# Patient Record
Sex: FEMALE | Race: OTHER | Employment: UNEMPLOYED | URBAN - METROPOLITAN AREA
[De-identification: names, ages, dates, MRNs, and addresses within clinical notes are randomized per-mention and may not be internally consistent; named-entity substitution may affect disease eponyms.]

---

## 2017-07-28 ENCOUNTER — HOSPITAL ENCOUNTER (EMERGENCY)
Age: 22
Discharge: HOME | End: 2017-07-29
Attending: EMERGENCY MEDICINE
Payer: SELF-PAY

## 2017-07-28 VITALS
DIASTOLIC BLOOD PRESSURE: 92 MMHG | SYSTOLIC BLOOD PRESSURE: 142 MMHG | HEART RATE: 85 BPM | RESPIRATION RATE: 16 BRPM | OXYGEN SATURATION: 100 % | HEIGHT: 65 IN | TEMPERATURE: 98 F | WEIGHT: 192 LBS | BODY MASS INDEX: 31.99 KG/M2

## 2017-07-28 DIAGNOSIS — Z32.01 PREGNANCY TEST PERFORMED, PREGNANCY CONFIRMED: Primary | ICD-10-CM

## 2017-07-28 LAB
BASOPHILS # BLD AUTO: 0 K/UL (ref 0–0.2)
BASOPHILS # BLD: 0 % (ref 0–2)
DIFFERENTIAL METHOD BLD: ABNORMAL
EOSINOPHIL # BLD: 0.2 K/UL (ref 0–0.8)
EOSINOPHIL NFR BLD: 1 % (ref 0.5–7.8)
ERYTHROCYTE [DISTWIDTH] IN BLOOD BY AUTOMATED COUNT: 15.1 % (ref 11.9–14.6)
HCT VFR BLD AUTO: 37.5 % (ref 35.8–46.3)
HGB BLD-MCNC: 12.7 G/DL (ref 11.7–15.4)
IMM GRANULOCYTES # BLD: 0 K/UL (ref 0–0.5)
IMM GRANULOCYTES NFR BLD AUTO: 0.3 % (ref 0–5)
LYMPHOCYTES # BLD AUTO: 24 % (ref 13–44)
LYMPHOCYTES # BLD: 3 K/UL (ref 0.5–4.6)
MCH RBC QN AUTO: 28.7 PG (ref 26.1–32.9)
MCHC RBC AUTO-ENTMCNC: 33.9 G/DL (ref 31.4–35)
MCV RBC AUTO: 84.7 FL (ref 79.6–97.8)
MONOCYTES # BLD: 1.1 K/UL (ref 0.1–1.3)
MONOCYTES NFR BLD AUTO: 9 % (ref 4–12)
NEUTS SEG # BLD: 8.2 K/UL (ref 1.7–8.2)
NEUTS SEG NFR BLD AUTO: 66 % (ref 43–78)
PLATELET # BLD AUTO: 203 K/UL (ref 150–450)
PMV BLD AUTO: 12.7 FL (ref 10.8–14.1)
RBC # BLD AUTO: 4.43 M/UL (ref 4.05–5.25)
WBC # BLD AUTO: 12.5 K/UL (ref 4.3–11.1)

## 2017-07-28 PROCEDURE — 99284 EMERGENCY DEPT VISIT MOD MDM: CPT | Performed by: EMERGENCY MEDICINE

## 2017-07-28 PROCEDURE — 85025 COMPLETE CBC W/AUTO DIFF WBC: CPT | Performed by: EMERGENCY MEDICINE

## 2017-07-28 PROCEDURE — 80053 COMPREHEN METABOLIC PANEL: CPT | Performed by: EMERGENCY MEDICINE

## 2017-07-29 LAB
ALBUMIN SERPL BCP-MCNC: 2.8 G/DL (ref 3.5–5)
ALBUMIN/GLOB SERPL: 0.7 {RATIO} (ref 1.2–3.5)
ALP SERPL-CCNC: 495 U/L (ref 50–136)
ALT SERPL-CCNC: 18 U/L (ref 12–65)
ANION GAP BLD CALC-SCNC: 11 MMOL/L (ref 7–16)
AST SERPL W P-5'-P-CCNC: 22 U/L (ref 15–37)
BILIRUB SERPL-MCNC: 0.3 MG/DL (ref 0.2–1.1)
BUN SERPL-MCNC: 9 MG/DL (ref 6–23)
CALCIUM SERPL-MCNC: 8.7 MG/DL (ref 8.3–10.4)
CHLORIDE SERPL-SCNC: 109 MMOL/L (ref 98–107)
CO2 SERPL-SCNC: 22 MMOL/L (ref 21–32)
CREAT SERPL-MCNC: 0.58 MG/DL (ref 0.6–1)
GLOBULIN SER CALC-MCNC: 4.3 G/DL (ref 2.3–3.5)
GLUCOSE SERPL-MCNC: 74 MG/DL (ref 65–100)
POTASSIUM SERPL-SCNC: 3.9 MMOL/L (ref 3.5–5.1)
PROT SERPL-MCNC: 7.1 G/DL (ref 6.3–8.2)
SODIUM SERPL-SCNC: 142 MMOL/L (ref 136–145)

## 2017-07-29 NOTE — ED NOTES
I have reviewed discharge instructions with the patient. The patient verbalized understanding.  at bedside for any questions or clarifications.

## 2017-07-29 NOTE — ED TRIAGE NOTES
Pt arrives complaining of a pregnancy problem. States she is 39 weeks and has been having contractions today. Pt states the contractions are about 5 minutes apart. Pt states she's had a previous child. Pt has had prenatal care at [facility]. Pt states the contractions started about 2-3 hours ago. Pt speaks no [place],  on the way.

## 2017-07-29 NOTE — ED PROVIDER NOTES
HPI Comments: Patient is  at approximately 39 weeks, OB is at [facility]. She started having some contractions this afternoon. She went to [facility] and was seen by OB. She was told that she was 3 cm dilated and was sent home. She went home and started having similar symptoms she came here. She states that she gets lower abdominal pain when she walks around    Elements of this note were made using speech recognition software. As such, errors of speech recognition may occur. Patient is a 24 y.o. female presenting with pregnancy problem. The history is provided by the patient. The history is limited by a language barrier. A  was used. Pregnancy Problem    Pertinent negatives include no fever, no nausea and no vomiting. History reviewed. No pertinent past medical history. History reviewed. No pertinent surgical history. History reviewed. No pertinent family history. Social History     Social History    Marital status: N/A     Spouse name: N/A    Number of children: N/A    Years of education: N/A     Occupational History    Not on file. Social History Main Topics    Smoking status: Not on file    Smokeless tobacco: Not on file    Alcohol use Not on file    Drug use: Not on file    Sexual activity: Not on file     Other Topics Concern    Not on file     Social History Narrative    No narrative on file         ALLERGIES: Review of patient's allergies indicates no known allergies. Review of Systems   Constitutional: Negative for chills and fever. Gastrointestinal: Negative for nausea and vomiting. All other systems reviewed and are negative. Vitals:    17 2322   BP: (!) 142/92   Pulse: 85   Resp: 16   Temp: 98 °F (36.7 °C)   SpO2: 100%   Weight: 87.1 kg (192 lb)   Height: 5' 5\" (1.651 m)            Physical Exam   Constitutional: She appears well-developed and well-nourished. HENT:   Head: Normocephalic and atraumatic.    Eyes: Conjunctivae are normal. Pupils are equal, round, and reactive to light. Neck: Normal range of motion. Neck supple. Cardiovascular: Normal rate and regular rhythm. Pulmonary/Chest: Effort normal and breath sounds normal.   Abdominal: Soft. Bowel sounds are normal. She exhibits distension. Gravid uterus   Genitourinary:   Genitourinary Comments: Cervix is 3 cm   Musculoskeletal: She exhibits no edema or tenderness. Neurological: She is alert. Skin: Skin is warm and dry. Nursing note and vitals reviewed. MDM  Number of Diagnoses or Management Options  Diagnosis management comments: Differential diagnosis: Labor, [place] contractions, normal pregnancy  12:03 AM surgical records shows that she was just seen at [facility] earlier this evening. I spoke with Dr. Yu Fairchild, she is familiar with patient. She states that the patient is being scheduled for induction tomorrow. Hussein Jacobo going to call her with a time. She states she was 3 cm dilated and had no ferning on their exam.  The patient's blood pressure is currently normal and her fetal heart tones in the 140s.   She is currently stable for discharge       Amount and/or Complexity of Data Reviewed  Clinical lab tests: ordered and reviewed  Decide to obtain previous medical records or to obtain history from someone other than the patient: yes  Review and summarize past medical records: yes  Discuss the patient with other providers: yes    Risk of Complications, Morbidity, and/or Mortality  Presenting problems: moderate  Diagnostic procedures: moderate  Management options: moderate    Patient Progress  Patient progress: stable    ED Course       Procedures

## 2017-07-29 NOTE — DISCHARGE INSTRUCTIONS
Aprenda sobre la planificación de un futuro embarazo - [ Learning About Planning for Future Pregnancy ]  ¿Cómo puede prepararse para el Brennen Bolton? Incluso antes de [facility], anneliese puede ayudar a que montero embarazo sea lo más saludable posible. [place] las siguientes medidas:  · Ricki Chyle a un médico o a darrion enfermera partera certificada para hacerse un examen. Hable acerca de los medicamentos, vitaminas y hierbas que herve. Hable acerca de cualquier problema de jolie o inquietud que tenga. · No tome medicamentos antiinflamatorios no esteroideos (JEAN). Ejemplos de estos son el ibuprofeno y la aspirina. Pueden aumentar el riesgo de aborto espontáneo. El riesgo es mayor cerca del momento de la ceci o si los herve avril más de [place]. · [place] diariamente algún multivitamínico o darrion vitamina prenatal. Asegúrese de que contenga ácido fólico. [place] reducirá la probabilidad de tener un bebé con darrion anomalía congénita (de nacimiento). · Lleve un registro de montero ciclo menstrual. [place] es darrion buena idea por [place] a saber cuál es el mejor momento para tratar de [place]. Y puede ayudar a montero médico o partera a calcular la fecha del parto y cómo está creciendo montero bebé. · [place] decisiones saludables y coma johnny. Evite la cafeína o redúzcala a solo 1 taza de café o té al día. Evite el alcohol, los cigarrillos y las drogas [address] solo los [employer] que montero médico o la partera le autoricen. · Perez bastante ejercicio. Un cuerpo angelica hará que el embarazo y el parto donna más fáciles. También la ayudará a recuperarse después del parto. Y el ejercicio puede ayudar a mejorar montero estado de ánimo. ¿Qué otros exámenes o pruebas debe hacerse? Antes de tratar de quedar embarazada, [place] cualquier otro problema de jolie que pueda Agia Thekla. · Si tiene diabetes o presión arterial bran, o si está obesa, hable con montero médico antes de quedar embarazada.  Juntos podrán hacer un plan sobre cómo controlar estos problemas de [place]. · Hable con montero médico acerca de cualquier medicamento que usted tome. [place] si es seguro seguir tomándolo avril el FIZZA. · Póngase cualquier vacuna que necesite. Pueden ayudar a prevenir anomalías congénitas, un aborto espontáneo o que nazca muerto el bebé debido a infecciones ant la rubéola o el sarampión. Pregúntele a montero médico cuánto tiempo debe esperar después de haberse aplicado darrion vacuna antes de intentar Sean Mocha. · Hable con montero médico acerca de si debe hacerse pruebas de detección de enfermedades hereditarias (trastornos genéticos). Estas enfermedades incluyen:  ¨ La fibrosis quística. ¨ La enfermedad de células falciformes. ¨ La enfermedad de [place]. Si piensa que pudiera estar embarazada  · Puede utilizar darrion prueba de embarazo en montero hogar a partir del primer día en que no le llegó montero período menstrual.  · Tan pronto ant sepa que está [place], zoraida darrion damian con montero médico o enfermera partera certificada. Montero primera visita prenatal proporcionará información que puede ser [place] para verificar si hay problemas a medida que avanza montero embarazo. ¿Dónde puede encontrar más información en inglés? Mickey Litter a [email or web address]-truong.info/. Francisco Plummerow L272 en la búsqueda para aprender más acerca de \"Aprenda sobre la planificación de un futuro embarazo - [ Learning About Planning for Future Pregnancy ]. \"  Revisado: 16 marzo, 2017  Versión del contenido: 11.3  © 6184-0583 [employer], [employer]. Las instrucciones de cuidado fueron adaptadas bajo licencia por Good Help Connections (which disclaims liability or warranty for this information). Si usted tiene [place] afección médica o sobre estas instrucciones, siempre pregunte a montero profesional de jolie. [facility], Incorporated niega toda garantía o responsabilidad por montero uso de esta información.

## 2019-03-11 ENCOUNTER — HOSPITAL ENCOUNTER (EMERGENCY)
Age: 24
Discharge: HOME | End: 2019-03-11
Attending: EMERGENCY MEDICINE
Payer: MEDICAID

## 2019-03-11 ENCOUNTER — APPOINTMENT (OUTPATIENT)
Dept: CT IMAGING | Age: 24
End: 2019-03-11
Attending: EMERGENCY MEDICINE
Payer: MEDICAID

## 2019-03-11 VITALS
WEIGHT: 170 LBS | HEART RATE: 68 BPM | DIASTOLIC BLOOD PRESSURE: 80 MMHG | TEMPERATURE: 98.4 F | SYSTOLIC BLOOD PRESSURE: 136 MMHG | OXYGEN SATURATION: 98 % | RESPIRATION RATE: 16 BRPM

## 2019-03-11 DIAGNOSIS — G51.0 LEFT-SIDED BELL'S PALSY: ICD-10-CM

## 2019-03-11 DIAGNOSIS — H81.392 PERIPHERAL VERTIGO INVOLVING LEFT EAR: ICD-10-CM

## 2019-03-11 DIAGNOSIS — R51.9 LEFT TEMPORAL HEADACHE: Primary | ICD-10-CM

## 2019-03-11 LAB
ANION GAP SERPL CALC-SCNC: 5 MMOL/L (ref 7–16)
BACTERIA URNS QL MICRO: ABNORMAL /HPF
BASOPHILS # BLD: 0 K/UL (ref 0–0.2)
BASOPHILS NFR BLD: 0 % (ref 0–2)
BUN SERPL-MCNC: 9 MG/DL (ref 6–23)
CALCIUM SERPL-MCNC: 8.8 MG/DL (ref 8.3–10.4)
CASTS URNS QL MICRO: ABNORMAL /LPF
CHLORIDE SERPL-SCNC: 109 MMOL/L (ref 98–107)
CO2 SERPL-SCNC: 28 MMOL/L (ref 21–32)
CREAT SERPL-MCNC: 0.74 MG/DL (ref 0.6–1)
DIFFERENTIAL METHOD BLD: ABNORMAL
EOSINOPHIL # BLD: 0.4 K/UL (ref 0–0.8)
EOSINOPHIL NFR BLD: 3 % (ref 0.5–7.8)
EPI CELLS #/AREA URNS HPF: ABNORMAL /HPF
ERYTHROCYTE [DISTWIDTH] IN BLOOD BY AUTOMATED COUNT: 16.4 % (ref 11.9–14.6)
GLUCOSE SERPL-MCNC: 91 MG/DL (ref 65–100)
HCG UR QL: NEGATIVE
HCT VFR BLD AUTO: 40 % (ref 35.8–46.3)
HGB BLD-MCNC: 12.8 G/DL (ref 11.7–15.4)
IMM GRANULOCYTES # BLD AUTO: 0.1 K/UL (ref 0–0.5)
IMM GRANULOCYTES NFR BLD AUTO: 0 % (ref 0–5)
LYMPHOCYTES # BLD: 4.6 K/UL (ref 0.5–4.6)
LYMPHOCYTES NFR BLD: 29 % (ref 13–44)
MCH RBC QN AUTO: 27.8 PG (ref 26.1–32.9)
MCHC RBC AUTO-ENTMCNC: 32 G/DL (ref 31.4–35)
MCV RBC AUTO: 86.8 FL (ref 79.6–97.8)
MONOCYTES # BLD: 0.9 K/UL (ref 0.1–1.3)
MONOCYTES NFR BLD: 6 % (ref 4–12)
NEUTS SEG # BLD: 9.6 K/UL (ref 1.7–8.2)
NEUTS SEG NFR BLD: 62 % (ref 43–78)
NRBC # BLD: 0 K/UL (ref 0–0.2)
PLATELET # BLD AUTO: 348 K/UL (ref 150–450)
PMV BLD AUTO: 11.7 FL (ref 9.4–12.3)
POTASSIUM SERPL-SCNC: 3.6 MMOL/L (ref 3.5–5.1)
RBC # BLD AUTO: 4.61 M/UL (ref 4.05–5.2)
RBC #/AREA URNS HPF: ABNORMAL /HPF
SODIUM SERPL-SCNC: 142 MMOL/L (ref 136–145)
WBC # BLD AUTO: 15.7 K/UL (ref 4.3–11.1)
WBC URNS QL MICRO: ABNORMAL /HPF

## 2019-03-11 PROCEDURE — 80048 BASIC METABOLIC PNL TOTAL CA: CPT

## 2019-03-11 PROCEDURE — 85025 COMPLETE CBC W/AUTO DIFF WBC: CPT

## 2019-03-11 PROCEDURE — 96374 THER/PROPH/DIAG INJ IV PUSH: CPT | Performed by: EMERGENCY MEDICINE

## 2019-03-11 PROCEDURE — 70450 CT HEAD/BRAIN W/O DYE: CPT

## 2019-03-11 PROCEDURE — 96375 TX/PRO/DX INJ NEW DRUG ADDON: CPT | Performed by: EMERGENCY MEDICINE

## 2019-03-11 PROCEDURE — 99284 EMERGENCY DEPT VISIT MOD MDM: CPT | Performed by: EMERGENCY MEDICINE

## 2019-03-11 PROCEDURE — 81015 MICROSCOPIC EXAM OF URINE: CPT

## 2019-03-11 PROCEDURE — 81025 URINE PREGNANCY TEST: CPT

## 2019-03-11 PROCEDURE — 74011250636 HC RX REV CODE- 250/636: Performed by: EMERGENCY MEDICINE

## 2019-03-11 RX ORDER — KETOROLAC TROMETHAMINE 30 MG/ML
30 INJECTION, SOLUTION INTRAMUSCULAR; INTRAVENOUS
Status: COMPLETED | OUTPATIENT
Start: 2019-03-11 | End: 2019-03-11

## 2019-03-11 RX ORDER — BUTALBITAL, ACETAMINOPHEN AND CAFFEINE 300; 40; 50 MG/1; MG/1; MG/1
1 CAPSULE ORAL
Qty: 12 CAP | Refills: 0 | Status: SHIPPED | OUTPATIENT
Start: 2019-03-11

## 2019-03-11 RX ORDER — PREDNISONE 20 MG/1
40 TABLET ORAL DAILY
Qty: 10 TAB | Refills: 0 | Status: SHIPPED | OUTPATIENT
Start: 2019-03-11 | End: 2019-03-16

## 2019-03-11 RX ORDER — PROMETHAZINE HYDROCHLORIDE 25 MG/1
25 TABLET ORAL
Qty: 12 TAB | Refills: 0 | Status: SHIPPED | OUTPATIENT
Start: 2019-03-11

## 2019-03-11 RX ORDER — ONDANSETRON 2 MG/ML
4 INJECTION INTRAMUSCULAR; INTRAVENOUS
Status: COMPLETED | OUTPATIENT
Start: 2019-03-11 | End: 2019-03-11

## 2019-03-11 RX ADMIN — KETOROLAC TROMETHAMINE 30 MG: 30 INJECTION, SOLUTION INTRAMUSCULAR; INTRAVENOUS at 20:43

## 2019-03-11 RX ADMIN — SODIUM CHLORIDE 1000 ML: 900 INJECTION, SOLUTION INTRAVENOUS at 20:43

## 2019-03-11 RX ADMIN — ONDANSETRON 4 MG: 2 INJECTION INTRAMUSCULAR; INTRAVENOUS at 20:43

## 2019-03-11 NOTE — ED TRIAGE NOTES
Pt states bells palsy in left side of face that started 8 days ago. Pt states she was given acyclovir. Pt states she stopped taking these 3 days ago and now is feeling dizzy with some nausea as well.  States photophobia as well

## 2019-03-11 NOTE — PROGRESS NOTES
present for triage. Thank you,      Domingo Jalloh, [address] [facility] [address] /  [email or web address] c: [phone] / [address] / [place], [address] [facility]  [email or web address]

## 2019-03-12 NOTE — PROGRESS NOTES
present for assessment with Dr. Irma Manuel. Thank you,      Bolden Basiltaocorbin, [address] [facility] 151 /  [email or web address] c: [phone] / [address] / [place], [address] [facility]  [email or web address]

## 2019-03-12 NOTE — DISCHARGE INSTRUCTIONS
Phenergan is for nausea and dizziness. Fioricet for headache. Prednisone for swelling and to help with Bell's palsy. Important to call for primary care doctor to recheck. Recheck sooner for worse or worrisome symptoms. Patient Education        Parálisis facial de Mclean: Instrucciones de cuidado - [ Bell's Palsy: Care Instructions ]  Instrucciones de cuidado    La parálisis facial de [place]clean es darrion parálisis o debilitamiento de los músculos de un lado de la krystal. Las personas con parálisis facial de [place] suelen tener caído un lado de la boca y les anyi trabajo cerrar por completo el beryl de lottie mismo lado. La parálisis facial de [place] puede interferir también con el sentido del gusto. La [place] sucede cuando se inflama un nervio de la krystal. La causa de la parálisis facial de [place] no es un ataque cerebral. No se conoce la causa de esta inflamación del nervio. Jo algunos expertos piensan que la causa podría ser un virus. Debido a esto, en ocasiones los médicos recetan un medicamento antiviral para tratarla. También podrían darle medicamentos para reducir la hinchazón. La parálisis facial de [place]clean por lo general mejora por sí ginny en algunas semanas o meses. La atención de seguimiento es darrion parte clave de montero tratamiento y seguridad. Asegúrese de hacer y acudir a todas las citas, y llame a montero médico si está teniendo problemas. También es darrion buena idea saber los resultados de sona exámenes y mantener darrion lista de los medicamentos que herve. ¿Cómo puede cuidarse en el hogar? · [employer] medicamentos exactamente ant le fueron recetados. Llame a montero médico si jericho estar teniendo problemas con montero medicamento. Recibirá [employer] medicamentos específicos recetados por montero médico.  · Use lágrimas artificiales o pomada si se le secan demasiado los ojos. La parálisis facial de [place]clean puede causar la caída del párpado inferior, lo que produce sequedad en el beryl.   · Si no puede cerrar el beryl por completo, piense en usar un parche para dormir. · Ayúdese a parpadear usando un dedo para cerrar y abrir el párpado. [place] podría ayudar a mantener el beryl húmedo. · Use anteojos o gafas para prevenir que el polvo y la ally entren en el beryl. · A medida que recupera la sensación en la krystal, masajee la frente, las mejillas y los labios. El masaje podría fortalecer los músculos de la krystal. · Cepíllese los dientes y use hilo dental con frecuencia para ayudar a prevenir las caries. La parálisis facial de [place]clean puede secar la saliva en un lado de montero boca. [place] aumenta el riesgo de caries. ¿Cuándo debe pedir ayuda? Llame al 911 en cualquier momento que considere que necesita atención de [place]. Por ejemplo, llame si:    · Tiene síntomas de un ataque cerebral. Estos pueden incluir:  ? Entumecimiento, hormigueo, debilidad o parálisis repentinos en la krystal, el brazo o la pierna, sobre todo si ocurre en un solo lado del cuerpo. ? Cambios súbitos en la vista. ? Problemas repentinos para hablar. ? Confusión súbita o dificultad repentina para comprender frases sencillas. ? Problemas repentinos para caminar o mantener el equilibrio. ? Un dolor de ebonie intenso y repentino, distinto a los beverly de ebonie anteriores.    Llame a montero médico ahora mismo o busque atención médica inmediata si:    · Siente entumecimiento o debilidad que se expande más allá de un lado de la krystal.     · Tiene un salpullido en la piel o dolor o enrojecimiento en el beryl, o le molesta la victoria.     · Tiene nuevo dolor de ebonie o cyrus empeora.    Preste especial atención a los cambios en montero jolie y asegúrese de comunicarse con montero médico si:    · No mejora ant se esperaba. ¿Dónde puede encontrar más información en inglés? Carmen Presto a [email or web address]-truong.[email or web address] Solange Ferrer [id number] en la búsqueda para aprender más acerca de \"Parálisis facial de Mclean: Instrucciones de cuidado - [ Bell's Palsy: Care Instructions ]. \"  Revisado: 3 shu, 2018  Versión del contenido: 11.9  © 7556-2102 Healthwise, Incorporated. Las instrucciones de cuidado fueron adaptadas bajo licencia por Good Help Connections (which disclaims liability or warranty for this information). Si usted tiene [place] afección médica o sobre estas instrucciones, siempre pregunte a montero profesional de jolie. Healthwise, Incorporated niega toda garantía o responsabilidad por montero uso de esta información. Patient Education        Vértigo: Sylwia Salazar - [ Vertigo: Care Instructions ]  Instrucciones de cuidado    El vértigo es darrion sensación de que usted o el ambiente que le rodea se mueve cuando no es así. Con frecuencia se describe ant darrion sensación de girar, cody vueltas, caer o inclinarse. El vértigo podría hacer que vomite o sienta náuseas. Podría tener dificultades para caminar o estar de pie y podría perder el equilibrio. El vértigo es a [employer] relacionado con un problema del oído interno, minor puede tener otras causas más serias. Si el vértigo continúa, usted podría necesitar más pruebas para hallar montero causa. La atención de seguimiento es darrion parte clave de montero tratamiento y seguridad. Asegúrese de hacer y acudir a todas las citas, y llame a montero médico si está teniendo problemas. También es darrion buena idea saber los resultados de sona exámenes y mantener darrion lista de los medicamentos que herve. ¿Cómo puede cuidarse en el hogar? · No se acueste boca arriba. Elévese un poco. [place] podría reducir la sensación de girar. Mantenga los ojos abiertos. · Muévase despacio para no caer. · Si montero médico le recomienda algún medicamento, tómelo exactamente según las indicaciones. · No conduzca cuando tenga vértigo. Ciertos ejercicios, conocidos ant ejercicios de Master, pueden ayudar a disminuir el vértigo. Para hacer los ejercicios de Master:  · Siéntese al borde de darrion cama o un sofá y acuéstese rápido del lado que le causa el peor vértigo.  Acuéstese de lado, sobre Climmie Gentry afectado. · Quédese en matilda posición avril por lo menos 30 segundos o hasta que el vértigo pase. · Siéntese. Si esto le causa vértigo, espere a que pase. · Repita cyrus procedimiento del otro lado. · Repita esto 10 veces. Perez estos ejercicios 2 veces al día hasta que la sensación de vértigo desaparezca. ¿Cuándo debe pedir ayuda? Llame al 911 en cualquier momento que considere que necesita atención de emergencia. Por ejemplo, llame si:    · Se desmayó (perdió el conocimiento).     · Tiene síntomas de un ataque cerebral. Estos podrían incluir:  ? Entumecimiento, hormigueo, debilitamiento o pérdida de movimiento repentinos en la krystal, el brazo o la pierna, sobre todo si ocurre en un solo lado del cuerpo. ? Cambios repentinos en la visión. ? Dificultades repentinas para hablar. ? Confusión repentina o dificultad para comprender frases sencillas. ? Problemas repentinos para caminar o mantener el equilibrio. ? Dolor de [place] intenso y repentino, distinto de los beverly de Marvin Olden a montero médico ahora mismo o busque atención médica inmediata si:    · Tiene vértigo junto con fiebre, dolor de ebonie o zumbido en los oídos.     · Tiene náuseas o vómito nuevos o éstos aumentan.    Preste especial atención a los cambios en montero jolie y asegúrese de comunicarse con montero médico si:    · El vértigo empeora u ocurre con mayor frecuencia.     · El vértigo no mejora después de 2 semanas. ¿Dónde puede encontrar más información en inglés? Ismael Safer a http://june-truong.[email or web address] Alex [id number] en la búsqueda para aprender más acerca de \"Vértigo: Instrucciones de cuidado - [ Vertigo: Care Instructions ]. \"  Revisado: Lilliam 67, 2018  Versión del contenido: 11.9  © 9654-8463 [employer], [employer]. Las instrucciones de cuidado fueron adaptadas bajo licencia por Good Help Connections (which disclaims liability or warranty for this information).  Si usted tiene preguntas sobre darrion afección Xochitl o sobre estas instrucciones, siempre pregunte a montero profesional de jolie. [employer], Incorporated niega toda garantía o responsabilidad por montero uso de esta información. Patient Education        Alfredoor parviz [place]: Instrucciones de cuidado - [ Headache: Care Instructions ]  Instrucciones de cuidado    Los beverly de ebonie tienen muchas causas posibles. La mayoría de los beverly de ebonie no son señal de un problema más layo y mejoran por sí solos. El tratamiento en el hogar podría ayudarlo a sentirse mejor con Author Bryan. El médico lo millard revisado minuciosamente, minor puede desarrollar problemas más tarde. Si nota algún problema o síntomas, busque tratamiento médico inmediatamente. La atención de seguimiento es darrion parte clave de montero tratamiento y seguridad. Asegúrese de hacer y acudir a todas las citas, y llame a montero médico si está teniendo problemas. También es darrion buena idea saber los resultados de sona exámenes y mantener darrion lista de los medicamentos que herve. ¿Cómo puede cuidarse en el [facility]? · No conduzca si ha tomado analgésicos (medicamentos para el dolor) recetados. · Descanse en un cuarto tranquilo y oscuro hasta que desaparezca el dolor de Michael [place]. Cierre los ojos y trate de relajarse o dormirse. No charles la televisión ni iam. · Colóquese un paño frío y húmedo o Rustam [employer] compresa fría sobre la kanwal adolorida de 10 a 21 minutos cada vez. Póngase un paño goodman entre la compresa fría y la piel. · Utilice darrion toalla húmeda tibia o darrion almohadilla térmica ajustada a baja temperatura para relajar los músculos tensos del marc y los hombros.  · [place] a alguien que le zoraida masajes suaves en el marc y los hombros.  · [place] los analgésicos exactamente ant le fueron indicados. ? Si el médico le recetó un analgésico, tómelo según las indicaciones. ? Si no está tomando un analgésico recetado, pregúntele a montero médico si puede celestino mukund de [employer].   · Tenga cuidado de no celestino analgésicos con mayor frecuencia que la permitida en las indicaciones porque los beverly de ebonie podrían empeorar o aparecer con mayor frecuencia darrion vez que el medicamento pierda montero [place]. · Preste atención a los nuevos síntomas que aparecen con el dolor de [place], [place], debilidad o entumecimiento, cambios en la visión o confusión. Podrían ser señales de un problema más grave. Para prevenir los beverly de ebonie  · [employer] un diario de sona beverly de ebonie para que pueda averiguar qué los desencadena. Evitar los desencadenantes podría ayudar a prevenir los beverly de [place]. Anote cuándo empieza cada dolor de [place], cuánto dura y cómo es el dolor (palpitante, mateo, punzante o sordo). Anote cualquier otro síntoma que haya tenido con el dolor de [place], [place] náuseas, destellos de victoria o BARAK, o sensibilidad a la victoria brillante o a los ruidos anna. [place] si el dolor de ebonie ocurrió cerca de montero menstruación. Enumere todos los factores que pudieran mark desencadenado el dolor de [place], ant ciertos alimentos (chocolate, queso, vino) u olores, humo, luces brillantes, estrés o falta de sueño. · Encuentre maneras saludables de [facility]. Los beverly de [place] son más comunes avril o rosalia después de un momento estresante. Tómese un tiempo para relajarse antes y después de hacer algo que le haya causado un dolor de ebonie en el pasado. · Trate de mantener sona músculos relajados mediante darrion buena postura. Revise si tiene [place] [employer] de la Cristy, la krystal, el marc y los hombros y trate de relajarlos. Cuando se siente en un escritorio, cambie de posición con frecuencia y estírese por 27 segundos cada hora. · Perez suficiente ejercicio y duerma bastante. · Coma en forma regular y johnny. Largos períodos sin comer pueden provocar un dolor de ebonie. · Regálese un masaje. Algunas personas encuentran que los masajes hechos con regularidad son [place] para aliviar la tensión. · Limite la cafeína. No joey demasiado café, té ni sodas. Jo no deje de consumir cafeína de repente, porque eso también puede provocarle beverly de [place]. · Reduzca la tensión en los ojos a causa de la computadora parpadeando con frecuencia y apartando la mirada de la pantalla a menudo. Asegúrese de tener lentes adecuados y de que montero monitor esté colocado de manera correcta, ant a un brazo de distancia. · Busque ayuda si tiene depresión o ansiedad. Tonie beverly de [place] podrían relacionarse con estas afecciones. El tratamiento puede prevenir los beverly de [place] y ayudar con los síntomas de ansiedad o depresión. ¿Cuándo debe pedir ayuda? Llame al 911 en cualquier momento que piense que puede necesitar atención de emergencia. Por ejemplo, llame si:    · Tiene señales de un ataque cerebral. Estas pueden incluir:  ? Parálisis, entumecimiento o debilidad repentinos en la krystal, el brazo o la pierna, sobre todo si ocurre en un solo lado del cuerpo. ? Cambios repentinos en la visión. ? Dificultades repentinas para hablar. ? Confusión repentina o dificultad para comprender frases sencillas. ? Problemas repentinos para caminar o mantener el equilibrio. ? Dolor de [place] intenso y repentino, distinto de los beverly de ebonie anteriores.    Llame a montero médico ahora mismo o busque atención médica inmediata si:    · Tiene un dolor de Elsa Severs o peor.     · Montero dolor de ebonie empeora mucho. ¿Dónde puede encontrar más información en inglés? Bev Thomas a [email or web address]-truong.info/. Heather [id number] en la búsqueda para aprender más acerca de \"Dolor de ebonie: Instrucciones de cuidado - [ Headache: Care Instructions ]. \"  Revisado: 3 shu, 2018  Versión del contenido: 11.9  © 2029-4312 [employer], [employer]. Las instrucciones de cuidado fueron adaptadas bajo licencia por Good Help Connections (which disclaims liability or warranty for this information).  Si usted tiene [place] afección médica o sobre estas instrucciones, siempre pregunte a montero profesional de jolie. [facility], Incorporated niega toda garantía o responsabilidad por montero uso de esta información.

## 2019-03-12 NOTE — ED PROVIDER NOTES
78-year-old female through  complains of 4 day history of left-sided headache. Worse when she bends her neck and worse when she chews. She does have some trouble with nausea and vertigo. Diagnosed 8 days ago with left-sided Bell's palsy and placed on acyclovir. She is finished that. States the headache started 4 days ago and has progressed. She denies any abdominal pain or fever or diarrhea. The neck chest pain or shortness of breath. No fever. No focal numbness or weakness. No difficulty talking swallowing. The history is provided by the patient. A  was used. Nausea    This is a new problem. The current episode started more than 2 days ago. The problem has not changed since onset. There has been no fever. Associated symptoms include headaches, arthralgias, myalgias and headaches. Pertinent negatives include no chills, no fever, no abdominal pain, no diarrhea and no cough. The patient maybe pregnant. History reviewed. No pertinent past medical history. History reviewed. No pertinent surgical history. History reviewed. No pertinent family history. Social History     Socioeconomic History    Marital status:      Spouse name: Not on file    Number of children: Not on file    Years of education: Not on file    Highest education level: Not on file   Social Needs    Financial resource strain: Not on file    Food insecurity - worry: Not on file    Food insecurity - inability: Not on file    Transportation needs - medical: Not on file   [employer] needs - non-medical: Not on file   Occupational History    Not on file   Tobacco Use    Smoking status: Not on file   Substance and Sexual Activity    Alcohol use: Not on file    Drug use: Not on file    Sexual activity: Not on file   Other Topics Concern    Not on file   Social History Narrative    Not on file         ALLERGIES: Patient has no allergy information on record.     Review of Systems   Constitutional: Negative for chills and fever. HENT: Positive for ear pain, hearing loss and tinnitus. Negative for sinus pressure and sore throat. Eyes: Negative for pain and visual disturbance. Respiratory: Negative for cough and shortness of breath. Cardiovascular: Negative for chest pain. Gastrointestinal: Positive for nausea. Negative for abdominal pain, diarrhea and vomiting. Musculoskeletal: Positive for arthralgias and myalgias. Negative for back pain, gait problem, neck pain and neck stiffness. Skin: Negative for color change and rash. Neurological: Positive for headaches. Negative for dizziness, syncope, weakness and numbness. All other systems reviewed and are negative. Vitals:    03/11/19 1832   BP: 131/76   Pulse: 73   Resp: 18   Temp: 98.2 °F (36.8 °C)   SpO2: 97%   Weight: 77.1 kg (170 lb)            Physical Exam   Constitutional: She is oriented to person, place, and time. She appears well-developed and well-nourished. No distress. HENT:   Head: Normocephalic and atraumatic. Right Ear: External ear normal.   Left Ear: External ear normal.   Mouth/Throat: Oropharynx is clear and moist. No oropharyngeal exudate. TMs appear clear. Some tenderness all around the L TMJ   Eyes: Conjunctivae and EOM are normal. Pupils are equal, round, and reactive to light. Neck: Normal range of motion. Neck supple. Cardiovascular: Normal rate, regular rhythm and intact distal pulses. No murmur heard. Pulmonary/Chest: Breath sounds normal. No respiratory distress. Abdominal: Soft. Bowel sounds are normal. She exhibits no mass. There is no tenderness. There is no rebound and no guarding. No hernia. Neurological: She is alert and oriented to person, place, and time. Gait normal.   Nl speech   Skin: Skin is warm and dry. Psychiatric: She has a normal mood and affect. Her speech is normal.   Nursing note and vitals reviewed.        MDM  Number of Diagnoses or Management Options  Diagnosis management comments: Headache, strongly suggestive of meningitis or subarachnoid hemorrhage or gradual onset and lateralizing signs. Check a head CT to assess for any tumor or sinusitis. Check screening lab work.        Amount and/or Complexity of Data Reviewed  Clinical lab tests: ordered and reviewed  Tests in the radiology section of CPT®: ordered and reviewed    Risk of Complications, Morbidity, and/or Mortality  Presenting problems: moderate  Diagnostic procedures: low  Management options: moderate    Patient Progress  Patient progress: stable         Procedures    Results Include:    Recent Results (from the past 24 hour(s))   HCG URINE, QL. - POC    Collection Time: 03/11/19  8:46 PM   Result Value Ref Range    Pregnancy test,urine (POC) NEGATIVE  NEG     URINE MICROSCOPIC    Collection Time: 03/11/19  8:49 PM   Result Value Ref Range    WBC 10-20 0 /hpf    RBC 0-3 0 /hpf    Epithelial cells 0-3 0 /hpf    Bacteria 4+ (H) 0 /hpf    Casts 0-3 0 /lpf   METABOLIC PANEL, BASIC    Collection Time: 03/11/19  9:00 PM   Result Value Ref Range    Sodium 142 136 - 145 mmol/L    Potassium 3.6 3.5 - 5.1 mmol/L    Chloride 109 (H) 98 - 107 mmol/L    CO2 28 21 - 32 mmol/L    Anion gap 5 (L) 7 - 16 mmol/L    Glucose 91 65 - 100 mg/dL    BUN 9 6 - 23 MG/DL    Creatinine 0.74 0.6 - 1.0 MG/DL    GFR est AA >60 >60 ml/min/1.73m2    GFR est non-AA >60 >60 ml/min/1.73m2    Calcium 8.8 8.3 - 10.4 MG/DL   CBC WITH AUTOMATED DIFF    Collection Time: 03/11/19  9:00 PM   Result Value Ref Range    WBC 15.7 (H) 4.3 - 11.1 K/uL    RBC 4.61 4.05 - 5.2 M/uL    HGB 12.8 11.7 - 15.4 g/dL    HCT 40.0 35.8 - 46.3 %    MCV 86.8 79.6 - 97.8 FL    MCH 27.8 26.1 - 32.9 PG    MCHC 32.0 31.4 - 35.0 g/dL    RDW 16.4 (H) 11.9 - 14.6 %    PLATELET 175 245 - 654 K/uL    MPV 11.7 9.4 - 12.3 FL    ABSOLUTE NRBC 0.00 0.0 - 0.2 K/uL    DF AUTOMATED      NEUTROPHILS 62 43 - 78 %    LYMPHOCYTES 29 13 - 44 %    MONOCYTES 6 4.0 - 12.0 %    EOSINOPHILS 3 0.5 - 7.8 %    BASOPHILS 0 0.0 - 2.0 %    IMMATURE GRANULOCYTES 0 0.0 - 5.0 %    ABS. NEUTROPHILS 9.6 (H) 1.7 - 8.2 K/UL    ABS. LYMPHOCYTES 4.6 0.5 - 4.6 K/UL    ABS. MONOCYTES 0.9 0.1 - 1.3 K/UL    ABS. EOSINOPHILS 0.4 0.0 - 0.8 K/UL    ABS. BASOPHILS 0.0 0.0 - 0.2 K/UL    ABS. IMM. GRANS. 0.1 0.0 - 0.5 K/UL     Ct Head Wo Cont    Result Date: 3/11/2019  HEAD CT WITHOUT CONTRAST  3/11/2019 HISTORY:  Worsening left-sided headache x1 week TECHNIQUE: Noncontrast axial images were obtained through the brain. All CT scans at this facility used dose modulation, interactive reconstruction and/or weight based dosing when appropriate to reduce radiation dose to as low as reasonably achievable. COMPARISON: None FINDINGS: There is no acute intracranial hemorrhage, significant mass effect or CT evidence of acute large artery territorial infarction. Please note that a hyperacute infarct or small vessel infarct may not be apparent on initial CT imaging. There is no hydrocephalus , intra-axial mass or abnormal extra-axial fluid collection. There are no displaced skull fractures. The mastoid air cells and paranasal sinuses are clear where imaged. IMPRESSION: No acute findings     Explained to  was started the patient on something for headache and nausea and also prednisone and some meclizine if needed. Have patient follow up with primary care doctor. Suspect peripheral vertigo and Bell's palsy.

## 2019-03-12 NOTE — PROGRESS NOTES
present for re-evaluation with Dr. Lizet Hamilton and discharge instructions. Thank you,      Chuy Banks, [address] [facility] [address] /  [email or web address] c: [phone] / [address] / [place], [address] [facility]  [email or web address]

## 2019-03-12 NOTE — ED NOTES
I have reviewed discharge instructions with the patient. The patient verbalized understanding. Patient left ED via Discharge Method: ambulatory to Home with self. Opportunity for questions and clarification provided. Patient given 3 scripts. To continue your aftercare when you leave the hospital, you may receive an automated call from our care team to check in on how you are doing. This is a free service and part of our promise to provide the best care and service to meet your aftercare needs.  If you have questions, or wish to unsubscribe from this service please call [phone]. Thank you for Choosing our [address] [place] [address] Emergency Department.